# Patient Record
Sex: FEMALE | Race: AMERICAN INDIAN OR ALASKA NATIVE
[De-identification: names, ages, dates, MRNs, and addresses within clinical notes are randomized per-mention and may not be internally consistent; named-entity substitution may affect disease eponyms.]

---

## 2020-03-18 ENCOUNTER — HOSPITAL ENCOUNTER (EMERGENCY)
Dept: HOSPITAL 5 - ED | Age: 30
Discharge: LEFT BEFORE DISCHARGE | End: 2020-03-18
Payer: SELF-PAY

## 2020-03-18 VITALS — DIASTOLIC BLOOD PRESSURE: 72 MMHG | SYSTOLIC BLOOD PRESSURE: 117 MMHG

## 2020-03-18 DIAGNOSIS — R53.83: ICD-10-CM

## 2020-03-18 DIAGNOSIS — R51: Primary | ICD-10-CM

## 2020-03-18 LAB
ALBUMIN SERPL-MCNC: 4.6 G/DL (ref 3.9–5)
ALT SERPL-CCNC: 8 UNITS/L (ref 7–56)
BACTERIA #/AREA URNS HPF: (no result) /HPF
BAND NEUTROPHILS # (MANUAL): 0.2 K/MM3
BILIRUB UR QL STRIP: (no result)
BLOOD UR QL VISUAL: (no result)
BUN SERPL-MCNC: 11 MG/DL (ref 7–17)
BUN/CREAT SERPL: 18 %
CALCIUM SERPL-MCNC: 9.3 MG/DL (ref 8.4–10.2)
HCT VFR BLD CALC: 39.8 % (ref 30.3–42.9)
HEMOLYSIS INDEX: 6
HGB BLD-MCNC: 13.1 GM/DL (ref 10.1–14.3)
MCHC RBC AUTO-ENTMCNC: 33 % (ref 30–34)
MCV RBC AUTO: 90 FL (ref 79–97)
MUCOUS THREADS #/AREA URNS HPF: (no result) /HPF
MYELOCYTES # (MANUAL): 0 K/MM3
PH UR STRIP: 7 [PH] (ref 5–7)
PLATELET # BLD: 226 K/MM3 (ref 140–440)
PROMYELOCYTES # (MANUAL): 0 K/MM3
PROT UR STRIP-MCNC: (no result) MG/DL
RBC # BLD AUTO: 4.41 M/MM3 (ref 3.65–5.03)
RBC #/AREA URNS HPF: 11 /HPF (ref 0–6)
TOTAL CELLS COUNTED BLD: 100
UROBILINOGEN UR-MCNC: < 2 MG/DL (ref ?–2)
WBC #/AREA URNS HPF: < 1 /HPF (ref 0–6)

## 2020-03-18 PROCEDURE — 36415 COLL VENOUS BLD VENIPUNCTURE: CPT

## 2020-03-18 PROCEDURE — 99283 EMERGENCY DEPT VISIT LOW MDM: CPT

## 2020-03-18 PROCEDURE — 81025 URINE PREGNANCY TEST: CPT

## 2020-03-18 PROCEDURE — 85007 BL SMEAR W/DIFF WBC COUNT: CPT

## 2020-03-18 PROCEDURE — 85025 COMPLETE CBC W/AUTO DIFF WBC: CPT

## 2020-03-18 PROCEDURE — 80053 COMPREHEN METABOLIC PANEL: CPT

## 2020-03-18 PROCEDURE — 81001 URINALYSIS AUTO W/SCOPE: CPT

## 2020-03-18 NOTE — EMERGENCY DEPARTMENT REPORT
ED Headache HPI





- General


Chief Complaint: Headache


Stated Complaint: HEADACHE/FATIGUE


Time Seen by Provider: 03/18/20 14:37





- History of Present Illness


Initial Comments: 





This is a 29-year-old female nontoxic, well nourished in appearance, no acute 

signs of distress presents to the ED with c/o of acute on chronic headache.  Sta

hero has intermittent pelvic pains x1 month but denies any pain today.  Patient 

describes headache as diffuse with level of 3 out of 10.  Patient denies 

thunderclap headache.  Patient denies any radiation of pain.  Patient denies any

head trauma.  Patient denies any visual changes.  Patient denies worse headache.

 Patient stated that darkness makes headache better and bright lights make the 

headache worse.  Patient denies any numbness, tingling, fever, chills, nausea, 

vomiting, chest pain, shortness of breath, stiff neck.  Patient denies facial 

drooping or one sided weakness.  Patient denies any radiation of pain.  Patient 

denies any allergies.  Past medical history includes migraine headaches.


Timing/Duration: episodic


Quality: mild, achy


Recent Head Trauma: occasional headaches


Associated Symptoms: denies symptoms.  denies: confusion, fatigue, facial pain, 

fever/chills, flushing, loss of consciousness, nausea/vomiting, nasal 

congestion, nasal drainage, numbness in legs/feet, rash, seizures, sinus 

infection, stiff neck, vision changes, weakness


Allergies/Adverse Reactions: 


Allergies





No Known Allergies Allergy (Unverified 03/18/20 12:52)


   











ED Review of Systems


ROS: 


Stated complaint: HEADACHE/FATIGUE


Other details as noted in HPI





Constitutional: denies: chills, fever


Eyes: denies: eye pain, eye discharge, vision change


ENT: denies: ear pain, throat pain


Respiratory: denies: cough, shortness of breath, wheezing


Cardiovascular: denies: chest pain, palpitations


Endocrine: no symptoms reported


Gastrointestinal: denies: abdominal pain, nausea, diarrhea


Genitourinary: denies: urgency, dysuria, discharge


Musculoskeletal: denies: back pain, joint swelling, arthralgia


Skin: denies: rash, lesions


Neurological: headache.  denies: weakness, paresthesias


Psychiatric: denies: anxiety, depression


Hematological/Lymphatic: denies: easy bleeding, easy bruising





ED Past Medical Hx





- Past Medical History


Previous Medical History?: Yes


Hx Headaches / Migraines: Yes





- Surgical History


Past Surgical History?: No





- Social History


Smoking Status: Never Smoker


Substance Use Type: Alcohol





ED Physical Exam





- General


Limitations: No Limitations


General appearance: alert, in no apparent distress





- Head


Head exam: Present: atraumatic, normocephalic





- Eye


Eye exam: Present: normal appearance, PERRL, EOMI





- Neck


Neck exam: Present: normal inspection, full ROM.  Absent: tenderness, 

meningismus, lymphadenopathy





- Respiratory


Respiratory exam: Present: normal lung sounds bilaterally.  Absent: respiratory 

distress, wheezes, rales, rhonchi, stridor, chest wall tenderness, accessory 

muscle use, decreased breath sounds, prolonged expiratory





- Cardiovascular


Cardiovascular Exam: Present: regular rate, normal rhythm, normal heart sounds. 

Absent: irregular rhythm, systolic murmur, diastolic murmur, rubs, gallop





- GI/Abdominal


GI/Abdominal exam: Present: soft, normal bowel sounds.  Absent: distended, 

tenderness, guarding, rebound, rigid, diminished bowel sounds





- Extremities Exam


Extremities exam: Present: normal inspection, full ROM, normal capillary refill.

 Absent: tenderness





- Back Exam


Back exam: Present: normal inspection, full ROM.  Absent: tenderness, CVA 

tenderness (R), CVA tenderness (L), muscle spasm, paraspinal tenderness, 

vertebral tenderness, rash noted





- Neurological Exam


Neurological exam: Present: alert, oriented X3, normal gait





- Expanded Neurological Exam


  ** Expanded


Patient oriented to: Present: person, place, time


Cranial nerves: EOM's Intact: Normal, Facial Sensation: Normal


Cerebellar function: Finger to Nose: Normal


Upper motor neuron: Pronator Drift: Normal, Sensory Extinction: Normal


Motor strength exam: RUE: 5, LUE: 5, RLE: 5, LLE: 5


Best Eye Response ([place]): (4) open spontaneously


Best Motor Response ([place]): (6) obeys commands


Best Verbal Response ([place]): (5) oriented


[place] Total: 15





- Psychiatric


Psychiatric exam: Present: normal affect, normal mood





- Skin


Skin exam: Present: warm, dry, intact, normal color.  Absent: rash





ED Course





                                   Vital Signs











  03/18/20





  12:53


 


Temperature 97.9 F


 


Pulse Rate 96 H


 


Respiratory 16





Rate 


 


Blood Pressure 117/72


 


O2 Sat by Pulse 100





Oximetry 














- Reevaluation(s)


Reevaluation #1: 





03/18/20 15:43


Patient is speaking in full sentences with no signs of distress noted.





ED Medical Decision Making





- Lab Data


Result diagrams: 


                                 03/18/20 14:22





                                 03/18/20 14:22





- Medical Decision Making





This is a 29-year-old female that presents with headache.  Patient is stable and

was examined by me.  Patient is neurologically stable.  There is no stiff neck 

or neck pain.  Vital signs are stable.  Patient is afebrile.  Patient refused 

treatment in the ED or CT scan of head.  Stated she just wants blood work done 

for a "check-up". Patient signed AMA form. Patient was referred to Follow-up 

with a primary care/neurologist doctor in 3-5 days or if symptoms worsen and 

continue return to emergency room as soon as possible.  At time of discharge, 

the patient does not seem toxic or ill in appearance.  No acute signs of 

distress noted.  Patient agrees to discharge treatment plan of care.  No further

questions noted by the patient.


Critical care attestation.: 


If time is entered above; I have spent that time in minutes in the direct care 

of this critically ill patient, excluding procedure time.








ED Disposition


Clinical Impression: 


Headache


Qualifiers:


 Headache type: unspecified Headache chronicity pattern: episodic headache 

Intractability: not intractable Qualified Code(s): R51 - Headache





Disposition: Z-07 MED SCREENING EXAM-LEFT


Is pt being admited?: No


Does the pt Need Aspirin: No


Condition: Undetermined


Instructions:  Acute Headache (ED)


Additional Instructions: 


Follow-up with a primary care doctor in 3-5 days or if symptoms worsen and 

continue return to emergency room as soon as possible. 





Your condition may be serious as instructed and educated today in the ER but you

decided to leave AGAINST MEDICAL ADVICE.  It is highly recommended to see a 

provider as soon as possible to rule out serious complications that was 

described to you during your ED stay.


Referrals: 


DEEPA COOK MD [Primary Care Provider] - 3-5 Days


SUNIL RAMIREZ MD [Staff Physician] - 3-5 Days


[facility] [Provider Group] - 3-5 Days


Forms:  AMA Form

## 2020-03-18 NOTE — EVENT NOTE
ED Screening Note


Date of service: 03/18/20


Time: 13:58


ED Screening Note: 


30 yo female presents with headache intermittently x 1 month


states headache is getting worse, states localized to both sides of head, no 

relief with advil


denies n/v/ blurry vission


stating she does not feel well, cc of pelvic pain and other symptoms


recently 


 hx of headache but not this bad


LMP 3/17/20





This initial assessment/diagnostic orders/clinical plan/treatment(s) is/are 

subject to change based on patients health status, clinical progression and re-

assessment by fellow clinical providers in the ED. Further treatment and workup 

at subsequent clinical providers discretion. Patient/guardian urged not to elope

from the ED as their condition may be serious if not clinically assessed and 

managed. 





Initial orders include: 


treatment


IV gerardo and zofran, toradol ?


cbc,bmp,ua


acc eval

## 2021-04-22 ENCOUNTER — HOSPITAL ENCOUNTER (OUTPATIENT)
Dept: HOSPITAL 5 - TRG | Age: 31
Discharge: HOME | End: 2021-04-22
Attending: OBSTETRICS & GYNECOLOGY
Payer: SELF-PAY

## 2021-04-22 VITALS — DIASTOLIC BLOOD PRESSURE: 59 MMHG | SYSTOLIC BLOOD PRESSURE: 97 MMHG

## 2021-04-22 DIAGNOSIS — Z34.93: Primary | ICD-10-CM

## 2021-04-22 DIAGNOSIS — Z3A.32: ICD-10-CM

## 2021-04-22 LAB
BACTERIA #/AREA URNS HPF: (no result) /HPF
BILIRUB UR QL STRIP: (no result)
BLOOD UR QL VISUAL: (no result)
MUCOUS THREADS #/AREA URNS HPF: (no result) /HPF
PH UR STRIP: 8 [PH] (ref 5–7)
PROT UR STRIP-MCNC: (no result) MG/DL
RBC #/AREA URNS HPF: 2 /HPF (ref 0–6)
UROBILINOGEN UR-MCNC: < 2 MG/DL (ref ?–2)
WBC #/AREA URNS HPF: 2 /HPF (ref 0–6)

## 2021-04-22 PROCEDURE — 81001 URINALYSIS AUTO W/SCOPE: CPT

## 2021-06-09 ENCOUNTER — HOSPITAL ENCOUNTER (INPATIENT)
Dept: HOSPITAL 5 - TRG | Age: 31
LOS: 3 days | Discharge: HOME | End: 2021-06-12
Attending: OBSTETRICS & GYNECOLOGY | Admitting: OBSTETRICS & GYNECOLOGY
Payer: COMMERCIAL

## 2021-06-09 DIAGNOSIS — Z3A.39: ICD-10-CM

## 2021-06-09 DIAGNOSIS — A60.09: ICD-10-CM

## 2021-06-09 DIAGNOSIS — Z20.822: ICD-10-CM

## 2021-06-09 LAB
HCT VFR BLD CALC: 32.3 % (ref 30.3–42.9)
HGB BLD-MCNC: 11.1 GM/DL (ref 10.1–14.3)
MCHC RBC AUTO-ENTMCNC: 34 % (ref 30–34)
MCV RBC AUTO: 91 FL (ref 79–97)
PLATELET # BLD: 140 K/MM3 (ref 140–440)
RBC # BLD AUTO: 3.56 M/MM3 (ref 3.65–5.03)

## 2021-06-09 PROCEDURE — 85014 HEMATOCRIT: CPT

## 2021-06-09 PROCEDURE — 36415 COLL VENOUS BLD VENIPUNCTURE: CPT

## 2021-06-09 PROCEDURE — 96360 HYDRATION IV INFUSION INIT: CPT

## 2021-06-09 PROCEDURE — 96361 HYDRATE IV INFUSION ADD-ON: CPT

## 2021-06-09 PROCEDURE — 99211 OFF/OP EST MAY X REQ PHY/QHP: CPT

## 2021-06-09 PROCEDURE — 86850 RBC ANTIBODY SCREEN: CPT

## 2021-06-09 PROCEDURE — 86900 BLOOD TYPING SEROLOGIC ABO: CPT

## 2021-06-09 PROCEDURE — 85027 COMPLETE CBC AUTOMATED: CPT

## 2021-06-09 PROCEDURE — 84112 EVAL AMNIOTIC FLUID PROTEIN: CPT

## 2021-06-09 PROCEDURE — 86901 BLOOD TYPING SEROLOGIC RH(D): CPT

## 2021-06-09 PROCEDURE — 85018 HEMOGLOBIN: CPT

## 2021-06-09 PROCEDURE — G0463 HOSPITAL OUTPT CLINIC VISIT: HCPCS

## 2021-06-09 PROCEDURE — U0003 INFECTIOUS AGENT DETECTION BY NUCLEIC ACID (DNA OR RNA); SEVERE ACUTE RESPIRATORY SYNDROME CORONAVIRUS 2 (SARS-COV-2) (CORONAVIRUS DISEASE [COVID-19]), AMPLIFIED PROBE TECHNIQUE, MAKING USE OF HIGH THROUGHPUT TECHNOLOGIES AS DESCRIBED BY CMS-2020-01-R: HCPCS

## 2021-06-09 RX ADMIN — AMPICILLIN SODIUM SCH MLS/HR: 1 INJECTION, POWDER, FOR SOLUTION INTRAMUSCULAR; INTRAVENOUS at 22:36

## 2021-06-09 RX ADMIN — SODIUM CHLORIDE, SODIUM LACTATE, POTASSIUM CHLORIDE, AND CALCIUM CHLORIDE SCH MLS/HR: .6; .31; .03; .02 INJECTION, SOLUTION INTRAVENOUS at 18:03

## 2021-06-09 NOTE — HISTORY AND PHYSICAL REPORT
History of Present Illness


Date of examination: 21


Date of admission: 


2021





Chief complaint: 





Leaking fluid this 0100 this morning


History of present illness: 





31 yo, G1 @ 39 wks gestation, initiated care at 18 wks gestation with [facility].  She present to [facility] with reports of leaking fluid since 0100 this AM 

(21).  Reports positive FM.  Denies any VB.


Labs: A+, antibody negative; Rubella immune; HIV negative; HBsAg negative; 

GC/Chlamydia/Trich negative; HSV-2 positive; 1hr gtt - 81; GBS negative.





Past History


Past Medical History: no pertinent history


Past Surgical History: no surgical history


GYN History: other (HSV-2)


Family/Genetic History: none


Social history: , full code.  denies: smoking, alcohol abuse, 

prescription drug abuse, IV drug use





- Obstetrical History


Expected Date of Delivery: 21


Actual Gestation: 39 Week(s) 0 Day(s) 


: 1


Para: 0


Hx # Term Pregnancies: 0


Number of  Pregnancies: 0


Spontaneous Abortions: 0


Induced : 0


Number of Living Children: 0





Medications and Allergies


                                    Allergies











Allergy/AdvReac Type Severity Reaction Status Date / Time


 


No Known Allergies Allergy   Verified 21 07:44














Review of Systems


All systems: negative


Genitourinary: leakage of fluid (since 100 - 21)





- Vital Signs


Vital signs: 


                                   Vital Signs











Pulse Pulse Ox


 


 80   98 


 


 21 08:09  21 08:09








                                        











Temp Pulse Resp BP Pulse Ox


 


 98.2 F   72   20   95/59   99 


 


 21 08:16  21 09:34  21 08:16  21 09:10  21 09:34














- Physical Exam


Breasts: Positive: normal


Cardiovascular: Regular rate


Lungs: Positive: Normal air movement


Abdomen: Positive: other (gravid)


Genitourinary (Female): Positive: normal external genitalia, normal perenium


Vagina: Positive: other (scant amount of clear fluids noted)


Uterus: Positive: enlarged (S=D)


Deep Tendon Reflex Grade: Normal +2





- Obstetrical


FHR: category 1


Uterine Contraction Monitor Mode: External


Cervical Dilatation: 3


Cervical Effacement Percentage: 70


Fetal station: -2


Uterine Contraction Frequency (min): 5-7


Uterine Contraction Pattern: Irregular


Uterine Tone Measurement Phase: Resting


Uterine Contraction Intensity: Mild





Results


Result Diagrams: 


                                 21 09:45





                              Abnormal lab results











  21 Range/Units





  08:25 


 


Fetal Membranes Rupture  Positive A  (Negative)  








All other labs normal.








Assessment and Plan





- Patient Problems


(1) SROM (spontaneous rupture of membranes)


Current Visit: Yes   Status: Acute   


Plan to address problem: 


Admit to L&D


 Initiate Pitocin 


 Initiate Ampicillin for prolonged rupture


 Pain meds as desired


 Anticipate 








(2) HSV-2 seropositive


Current Visit: Yes   Status: Acute

## 2021-06-10 PROCEDURE — 0HQ9XZZ REPAIR PERINEUM SKIN, EXTERNAL APPROACH: ICD-10-PCS | Performed by: OBSTETRICS & GYNECOLOGY

## 2021-06-10 PROCEDURE — 3E0R3BZ INTRODUCTION OF ANESTHETIC AGENT INTO SPINAL CANAL, PERCUTANEOUS APPROACH: ICD-10-PCS

## 2021-06-10 PROCEDURE — 00HU33Z INSERTION OF INFUSION DEVICE INTO SPINAL CANAL, PERCUTANEOUS APPROACH: ICD-10-PCS

## 2021-06-10 RX ADMIN — IBUPROFEN SCH MG: 600 TABLET, FILM COATED ORAL at 18:35

## 2021-06-10 RX ADMIN — AMPICILLIN SODIUM SCH MLS/HR: 1 INJECTION, POWDER, FOR SOLUTION INTRAMUSCULAR; INTRAVENOUS at 03:10

## 2021-06-10 RX ADMIN — SODIUM CHLORIDE, SODIUM LACTATE, POTASSIUM CHLORIDE, AND CALCIUM CHLORIDE SCH MLS/HR: .6; .31; .03; .02 INJECTION, SOLUTION INTRAVENOUS at 00:55

## 2021-06-10 NOTE — PROGRESS NOTE
Labor Epidural





- Labor Epidural


Start Time: 02:07


Stop Time: 02:11


Performed by:: NASIR HIGGINS


Procedure: 


Patient is requesting epidural for labor pain.  H&P, and labs reviewed.  

Procedure explained, questions answered, consent obtained. Patient in sitting 

position with blood pressure cuff and pulse ox on and working. Timeout performed

immediately before start of procedure.  Sterile chlorahexadine 0.5% prep/drape. 

3 mL 1% lidocaine skin wheal at L[3]-L[4].  18-gauge semanticlabstead epidural needle 

advanced to loss-of-resistance with saline at [7] cm.  27-gauge spinal needle 

advanced until clear, free-flowing CSF. Intrathecal dexmedetomidine [5] mcg 

administered and needle removed. Epidural catheter advanced to [12] cm, negative

 aspiration for blood and csf, negative test dose 3 ml 1.5% lidocaine with 

epinephrine. Sterile steri-strips and tegaderm applied, followed by tape 

reinforcement. Patient tolerated procedure well.

## 2021-06-10 NOTE — ANESTHESIA CONSULTATION
Anesthesia Consult and Med Hx


Date of service: 06/10/21





- Airway


Anesthetic Teeth Evaluation: Good


ROM Head & Neck: Adequate


Mental/Hyoid Distance: Adequate


Mallampati Class: Class II


Intubation Access Assessment: Probably Good





- Pulmonary Exam


CTA: Yes





- Cardiac Exam


Cardiac Exam: RRR





- Pre-Operative Health Status


ASA Pre-Surgery Classification: ASA2


Proposed Anesthetic Plan: Epidural





- Pulmonary


Hx Asthma: No





- Cardiovascular System


Hx Hypertension: No





- Central Nervous System


Hx Seizures: No


Hx Psychiatric Problems: No





- Endocrine


Hx Renal Disease: No


Hx Hypothyroidism: No


Hx Hyperthyroidism: No





- Hematic


Hx Anemia: No


Hx Sickle Cell Disease: No





- Other Systems


Hx Alcohol Use: No (prior to pregnancy)

## 2021-06-10 NOTE — PROGRESS NOTE
Assessment and Plan





- Patient Problems


(1) SROM (spontaneous rupture of membranes)


Current Visit: Yes   Status: Acute   


Plan to address problem: 


Continue Pitocin 


 Continue Ampicillin for prolonged rupture


 Anticipate 








(2) HSV-2 seropositive


Current Visit: Yes   Status: Acute   





Subjective





- Subjective


Date of service: 06/10/21


Principal diagnosis: SROM; active labor


Interval history: 





29 yo, G1 @ 39 wks gestation, initiated care at 18 wks gestation with [facility].  She present to [facility] with reports of leaking fluid since 0100 this AM 

(21).  Reports positive FM.  Denies any VB.


Labs: A+, antibody negative; Rubella immune; HIV negative; HBsAg negative; G

C/Chlamydia/Trich negative; HSV-2 positive; 1hr gtt - 81; GBS negative.


Patient reports: loss of fluid, fetal movement normal, contractions, no vaginal 

bleeding





Objective





- Vital Signs


Vital Signs: 


                               Vital Signs - 12hr











  06/10/21 06/10/21 06/10/21





  01:30 01:37 01:45


 


Temperature   


 


Pulse Rate   191 H


 


Respiratory   





Rate   


 


Blood Pressure   


 


O2 Sat by Pulse 80 L 94 84





Oximetry   














  06/10/21 06/10/21 06/10/21





  01:56 02:02 02:03


 


Temperature   


 


Pulse Rate 75 85 74


 


Respiratory   





Rate   


 


Blood Pressure   


 


O2 Sat by Pulse 0 L 94 86





Oximetry   














  06/10/21 06/10/21 06/10/21





  02:08 02:13 02:15


 


Temperature   


 


Pulse Rate 105 H 99 H 85


 


Respiratory   





Rate   


 


Blood Pressure  109/65 113/67


 


O2 Sat by Pulse 99 99 





Oximetry   














  06/10/21 06/10/21 06/10/21





  02:17 02:18 02:19


 


Temperature   


 


Pulse Rate 87 97 H 92 H


 


Respiratory   





Rate   


 


Blood Pressure 105/61  100/59


 


O2 Sat by Pulse  96 94





Oximetry   














  06/10/21 06/10/21 06/10/21





  02:21 02:23 02:25


 


Temperature   


 


Pulse Rate 93 H 88 85


 


Respiratory   





Rate   


 


Blood Pressure 102/63 99/54 102/59


 


O2 Sat by Pulse  99 





Oximetry   














  06/10/21 06/10/21 06/10/21





  02:27 02:28 02:29


 


Temperature   


 


Pulse Rate 93 H 89 83


 


Respiratory   





Rate   


 


Blood Pressure 101/58  106/60


 


O2 Sat by Pulse  98 





Oximetry   














  06/10/21 06/10/21 06/10/21





  02:31 02:33 02:38


 


Temperature   


 


Pulse Rate 91 H 89 84


 


Respiratory   





Rate   


 


Blood Pressure 92/58  


 


O2 Sat by Pulse  97 98





Oximetry   














  06/10/21 06/10/21 06/10/21





  02:43 02:46 02:48


 


Temperature   


 


Pulse Rate 87 86 88


 


Respiratory   





Rate   


 


Blood Pressure  90/54 


 


O2 Sat by Pulse 97  97





Oximetry   














  06/10/21 06/10/21 06/10/21





  02:53 02:58 03:02


 


Temperature   


 


Pulse Rate 80 83 88


 


Respiratory   





Rate   


 


Blood Pressure   89/51


 


O2 Sat by Pulse 97 96 





Oximetry   














  06/10/21 06/10/21 06/10/21





  03:03 03:08 03:13


 


Temperature   


 


Pulse Rate 90 89 84


 


Respiratory   





Rate   


 


Blood Pressure   


 


O2 Sat by Pulse 96 96 96





Oximetry   














  06/10/21 06/10/21 06/10/21





  03:15 03:16 03:18


 


Temperature   


 


Pulse Rate 90 106 H 80


 


Respiratory   





Rate   


 


Blood Pressure 89/54 86/50 


 


O2 Sat by Pulse   97





Oximetry   














  06/10/21 06/10/21 06/10/21





  03:23 03:28 03:32


 


Temperature   


 


Pulse Rate 83 72 88


 


Respiratory   





Rate   


 


Blood Pressure   101/53


 


O2 Sat by Pulse 97 99 





Oximetry   














  06/10/21 06/10/21 06/10/21





  03:33 03:38 03:43


 


Temperature   


 


Pulse Rate 85 86 85


 


Respiratory   





Rate   


 


Blood Pressure   


 


O2 Sat by Pulse 97 97 97





Oximetry   














  06/10/21 06/10/21 06/10/21





  03:46 03:49 03:54


 


Temperature   


 


Pulse Rate 90 81 107 H


 


Respiratory   





Rate   


 


Blood Pressure 107/57  


 


O2 Sat by Pulse  98 100





Oximetry   














  06/10/21 06/10/21 06/10/21





  03:59 04:01 04:04


 


Temperature   


 


Pulse Rate 106 H 112 H 93 H


 


Respiratory   





Rate   


 


Blood Pressure  114/66 


 


O2 Sat by Pulse 100  99





Oximetry   














  06/10/21 06/10/21 06/10/21





  04:09 04:14 04:17


 


Temperature   


 


Pulse Rate 89 94 H 100 H


 


Respiratory   





Rate   


 


Blood Pressure   100/57


 


O2 Sat by Pulse 98 97 





Oximetry   














  06/10/21 06/10/21 06/10/21





  04:19 04:24 04:29


 


Temperature   


 


Pulse Rate 90 89 83


 


Respiratory   





Rate   


 


Blood Pressure   


 


O2 Sat by Pulse 96 96 97





Oximetry   














  06/10/21 06/10/21 06/10/21





  04:31 04:34 04:39


 


Temperature   


 


Pulse Rate 88 85 91 H


 


Respiratory   





Rate   


 


Blood Pressure 111/59  


 


O2 Sat by Pulse  96 98





Oximetry   














  06/10/21 06/10/21 06/10/21





  04:44 04:46 04:49


 


Temperature   


 


Pulse Rate 94 H 90 86


 


Respiratory   





Rate   


 


Blood Pressure  108/57 


 


O2 Sat by Pulse 96  97





Oximetry   














  06/10/21 06/10/21 06/10/21





  04:54 04:59 05:02


 


Temperature   


 


Pulse Rate 94 H 92 H 88


 


Respiratory   





Rate   


 


Blood Pressure   100/59


 


O2 Sat by Pulse 97 98 





Oximetry   














  06/10/21 06/10/21 06/10/21





  05:04 05:09 05:14


 


Temperature   


 


Pulse Rate 91 H 95 H 87


 


Respiratory   





Rate   


 


Blood Pressure   


 


O2 Sat by Pulse 97 98 97





Oximetry   














  06/10/21 06/10/21 06/10/21





  05:19 05:21 05:24


 


Temperature   99.3 F


 


Pulse Rate 117 H 113 H 91 H


 


Respiratory   





Rate   


 


Blood Pressure  98/53 


 


O2 Sat by Pulse 100  97





Oximetry   














  06/10/21 06/10/21 06/10/21





  05:29 05:33 05:34


 


Temperature   


 


Pulse Rate 81 81 89


 


Respiratory   





Rate   


 


Blood Pressure  113/62 


 


O2 Sat by Pulse 98  98





Oximetry   














  06/10/21 06/10/21 06/10/21





  05:39 05:44 05:47


 


Temperature   


 


Pulse Rate 86 99 H 89


 


Respiratory   





Rate   


 


Blood Pressure   111/64


 


O2 Sat by Pulse 98 98 





Oximetry   














  06/10/21 06/10/21 06/10/21





  05:49 05:54 05:59


 


Temperature   


 


Pulse Rate 97 H 87 88


 


Respiratory   





Rate   


 


Blood Pressure   


 


O2 Sat by Pulse 100 97 98





Oximetry   














  06/10/21 06/10/21 06/10/21





  06:02 06:04 06:09


 


Temperature   


 


Pulse Rate 100 H 112 H 96 H


 


Respiratory   





Rate   


 


Blood Pressure 110/64  


 


O2 Sat by Pulse  99 100





Oximetry   














  06/10/21 06/10/21 06/10/21





  06:14 06:18 06:19


 


Temperature   


 


Pulse Rate 91 H 85 85


 


Respiratory   





Rate   


 


Blood Pressure  98/54 


 


O2 Sat by Pulse 97  97





Oximetry   














  06/10/21 06/10/21 06/10/21





  06:24 06:29 06:31


 


Temperature   


 


Pulse Rate 90 86 95 H


 


Respiratory   





Rate   


 


Blood Pressure   93/50


 


O2 Sat by Pulse 96 96 





Oximetry   














  06/10/21 06/10/21 06/10/21





  06:34 06:39 06:44


 


Temperature   


 


Pulse Rate 86 86 97 H


 


Respiratory   





Rate   


 


Blood Pressure   


 


O2 Sat by Pulse 96 96 100





Oximetry   














  06/10/21 06/10/21 06/10/21





  06:47 06:49 06:54


 


Temperature   


 


Pulse Rate 84 85 92 H


 


Respiratory   





Rate   


 


Blood Pressure 89/50  


 


O2 Sat by Pulse  98 99





Oximetry   














  06/10/21 06/10/21 06/10/21





  06:59 07:03 07:04


 


Temperature   


 


Pulse Rate 91 H 81 86


 


Respiratory   





Rate   


 


Blood Pressure  96/53 


 


O2 Sat by Pulse 99  97





Oximetry   














  06/10/21 06/10/21 06/10/21





  07:09 07:14 07:18


 


Temperature   


 


Pulse Rate 93 H 84 83


 


Respiratory   





Rate   


 


Blood Pressure   82/45


 


O2 Sat by Pulse 97 95 91





Oximetry   














  06/10/21 06/10/21 06/10/21





  07:19 07:24 07:29


 


Temperature   


 


Pulse Rate 95 H 89 86


 


Respiratory   





Rate   


 


Blood Pressure   


 


O2 Sat by Pulse 96 98 100





Oximetry   














  06/10/21 06/10/21 06/10/21





  07:32 07:34 07:39


 


Temperature   


 


Pulse Rate 104 H 98 H 90


 


Respiratory   





Rate   


 


Blood Pressure 112/69  


 


O2 Sat by Pulse  99 100





Oximetry   














  06/10/21 06/10/21 06/10/21





  07:41 07:44 07:47


 


Temperature 98.6 F  


 


Pulse Rate  94 H 93 H


 


Respiratory 18  





Rate   


 


Blood Pressure   109/55


 


O2 Sat by Pulse  99 





Oximetry   














  06/10/21 06/10/21 06/10/21





  07:49 07:54 07:59


 


Temperature   


 


Pulse Rate 97 H 80 90


 


Respiratory   





Rate   


 


Blood Pressure   


 


O2 Sat by Pulse 100 99 98





Oximetry   














  06/10/21 06/10/21 06/10/21





  08:01 08:04 08:09


 


Temperature   


 


Pulse Rate 88 80 84


 


Respiratory   





Rate   


 


Blood Pressure 111/58  


 


O2 Sat by Pulse  97 97





Oximetry   














  06/10/21 06/10/21 06/10/21





  08:14 08:16 08:19


 


Temperature   


 


Pulse Rate 84 93 H 81


 


Respiratory   





Rate   


 


Blood Pressure  103/60 


 


O2 Sat by Pulse 98  97





Oximetry   














  06/10/21 06/10/21 06/10/21





  08:24 08:29 08:31


 


Temperature   


 


Pulse Rate 84 83 93 H


 


Respiratory   





Rate   


 


Blood Pressure   106/61


 


O2 Sat by Pulse 96 96 





Oximetry   














  06/10/21 06/10/21 06/10/21





  08:34 08:39 08:44


 


Temperature   


 


Pulse Rate 82 91 H 80


 


Respiratory   





Rate   


 


Blood Pressure   


 


O2 Sat by Pulse 97 99 97





Oximetry   














  06/10/21 06/10/21 06/10/21





  08:47 08:49 08:54


 


Temperature   


 


Pulse Rate 81 83 82


 


Respiratory   





Rate   


 


Blood Pressure 111/65  


 


O2 Sat by Pulse  98 96





Oximetry   














  06/10/21 06/10/21 06/10/21





  08:59 09:01 09:04


 


Temperature   


 


Pulse Rate 81 82 100 H


 


Respiratory   





Rate   


 


Blood Pressure  115/68 


 


O2 Sat by Pulse 96  98





Oximetry   














  06/10/21 06/10/21 06/10/21





  09:09 09:14 09:17


 


Temperature   


 


Pulse Rate 82 93 H 96 H


 


Respiratory   





Rate   


 


Blood Pressure   123/68


 


O2 Sat by Pulse 97 97 





Oximetry   














  06/10/21 06/10/21 06/10/21





  09:19 09:24 09:29


 


Temperature   


 


Pulse Rate 84 81 83


 


Respiratory   





Rate   


 


Blood Pressure   


 


O2 Sat by Pulse 100 98 99





Oximetry   














  06/10/21 06/10/21 06/10/21





  09:32 09:34 09:36


 


Temperature   99.5 F


 


Pulse Rate 90 76 


 


Respiratory   





Rate   


 


Blood Pressure 116/70  


 


O2 Sat by Pulse  98 





Oximetry   














  06/10/21 06/10/21 06/10/21





  09:39 09:44 09:48


 


Temperature   


 


Pulse Rate 81 82 82


 


Respiratory   





Rate   


 


Blood Pressure   116/67


 


O2 Sat by Pulse 99 98 





Oximetry   














  06/10/21 06/10/21 06/10/21





  09:49 09:54 09:59


 


Temperature   


 


Pulse Rate 82 76 97 H


 


Respiratory   





Rate   


 


Blood Pressure   


 


O2 Sat by Pulse 98 99 100





Oximetry   














  06/10/21 06/10/21 06/10/21





  10:01 10:04 10:08


 


Temperature   


 


Pulse Rate 82 87 93 H


 


Respiratory   





Rate   


 


Blood Pressure 112/64  


 


O2 Sat by Pulse  100 92





Oximetry   














  06/10/21 06/10/21 06/10/21





  10:09 10:14 10:18


 


Temperature   


 


Pulse Rate 93 H 82 76


 


Respiratory   





Rate   


 


Blood Pressure   118/69


 


O2 Sat by Pulse 94 99 





Oximetry   














  06/10/21 06/10/21 06/10/21





  10:19 10:24 10:29


 


Temperature   


 


Pulse Rate 83 75 86


 


Respiratory   





Rate   


 


Blood Pressure   


 


O2 Sat by Pulse 96 100 98





Oximetry   














  06/10/21 06/10/21 06/10/21





  10:32 10:34 10:39


 


Temperature   


 


Pulse Rate 76 83 90


 


Respiratory   





Rate   


 


Blood Pressure 116/64  


 


O2 Sat by Pulse  98 98





Oximetry   














  06/10/21 06/10/21 06/10/21





  10:44 10:47 10:49


 


Temperature   


 


Pulse Rate 87 100 H 99 H


 


Respiratory   





Rate   


 


Blood Pressure  114/71 


 


O2 Sat by Pulse 100  99





Oximetry   














  06/10/21 06/10/21 06/10/21





  10:54 11:00 11:03


 


Temperature   


 


Pulse Rate 90 93 H 81


 


Respiratory   





Rate   


 


Blood Pressure   130/60


 


O2 Sat by Pulse 100 99 





Oximetry   














  06/10/21 06/10/21 06/10/21





  11:05 11:09 11:10


 


Temperature   


 


Pulse Rate 136 H 57 L 89


 


Respiratory   





Rate   


 


Blood Pressure   


 


O2 Sat by Pulse 95 62 L 99





Oximetry   














  06/10/21 06/10/21 06/10/21





  11:15 11:16 11:20


 


Temperature   


 


Pulse Rate 100 H 101 H 90


 


Respiratory   





Rate   


 


Blood Pressure  118/56 


 


O2 Sat by Pulse 99 82 L 98





Oximetry   














  06/10/21 06/10/21 06/10/21





  11:24 11:25 11:30


 


Temperature   


 


Pulse Rate 87 94 H 92 H


 


Respiratory   





Rate   


 


Blood Pressure   


 


O2 Sat by Pulse 93 96 99





Oximetry   














  06/10/21 06/10/21 06/10/21





  11:31 11:35 11:40


 


Temperature   


 


Pulse Rate 93 H 89 85


 


Respiratory   





Rate   


 


Blood Pressure 120/58  


 


O2 Sat by Pulse  100 100





Oximetry   














  06/10/21 06/10/21 06/10/21





  11:45 11:47 11:50


 


Temperature   


 


Pulse Rate 92 H 82 87


 


Respiratory   





Rate   


 


Blood Pressure  119/66 


 


O2 Sat by Pulse 99  100





Oximetry   














  06/10/21 06/10/21 06/10/21





  11:55 12:00 12:02


 


Temperature   


 


Pulse Rate 83 97 H 81


 


Respiratory   





Rate   


 


Blood Pressure   120/69


 


O2 Sat by Pulse 100 98 





Oximetry   














  06/10/21 06/10/21 06/10/21





  12:05 12:10 12:13


 


Temperature   


 


Pulse Rate 89 89 


 


Respiratory   





Rate   


 


Blood Pressure   


 


O2 Sat by Pulse 99 100 47 L





Oximetry   














  06/10/21 06/10/21 06/10/21





  12:16 12:17 12:21


 


Temperature   


 


Pulse Rate 86 97 H 76


 


Respiratory   





Rate   


 


Blood Pressure  131/77 


 


O2 Sat by Pulse 98  97





Oximetry   














  06/10/21 06/10/21 06/10/21





  12:26 12:31 12:36


 


Temperature   


 


Pulse Rate 97 H 105 H 85


 


Respiratory   





Rate   


 


Blood Pressure  118/77 


 


O2 Sat by Pulse 97 92 98





Oximetry   














  06/10/21 06/10/21 06/10/21





  12:37 12:41 12:46


 


Temperature   


 


Pulse Rate 109 H 88 85


 


Respiratory   





Rate   


 


Blood Pressure   


 


O2 Sat by Pulse 93 99 99





Oximetry   














  06/10/21 06/10/21 06/10/21





  12:47 12:51 12:56


 


Temperature   


 


Pulse Rate 113 H 100 H 105 H


 


Respiratory   





Rate   


 


Blood Pressure 121/58  


 


O2 Sat by Pulse 84 98 97





Oximetry   














  06/10/21 06/10/21 06/10/21





  13:01 13:02 13:06


 


Temperature   


 


Pulse Rate 104 H 86 105 H


 


Respiratory   





Rate   


 


Blood Pressure  111/59 


 


O2 Sat by Pulse 99  96





Oximetry   














  06/10/21 06/10/21 06/10/21





  13:11 13:16 13:17


 


Temperature   


 


Pulse Rate 89 106 H 81


 


Respiratory   





Rate   


 


Blood Pressure   123/71


 


O2 Sat by Pulse 98 99 





Oximetry   














  06/10/21 06/10/21





  13:21 13:26


 


Temperature  


 


Pulse Rate 94 H 93 H


 


Respiratory  





Rate  


 


Blood Pressure  


 


O2 Sat by Pulse 100 99





Oximetry  














- Exam


Breasts: deferred


Cardiovascular: Regular rate


Lungs: Normal air movement


FHR: category 1


Uterine Contraction Monitor Mode: External


Cervical Dilatation: 10 (vertex)


Cervical Effacement Percentage: 100 (Pitocin @ 12mu/min)


Fetal station: 0


Uterine Contraction Frequency (min): 2-3


Uterine Contraction Pattern: Regular


Uterine Tone Measurement Phase: Resting


Uterine Contraction Intensity: Strong/Firm





- Labs


Labs: 


                                  Abnormal Labs











  21





  08:25 09:45


 


RBC   3.56 L


 


Fetal Membranes Rupture  Positive A

## 2021-06-10 NOTE — PROCEDURE NOTE
OB Delivery Note





- Delivery


Date of Delivery: 06/10/21 (1415)


Surgeon: KAYLYN JOHNSON (CNM)


Estimated blood loss: other (QBL - 170mls)





- Vaginal


Delivery presentation: vertex


Delivery position: OA (JAMEL)


Intrapartum events: meconium (term), prolonged active phase, other(please 

specify) (SROM > 24hrs)


Delivery induction: none


Delivery augmentation: rupture of membranes (SROM @ 0100 on 21), pitocin


Delivery monitor: external FHT, external uterine


Route of delivery: 


Delivery placenta: spontaneous ()


Delivery cord: 3 umbilical vessels, other (Cord around left ankle x 2, reduced 

immediately at delivery)


Episiotomy: none


Delivery laceration: 1st degree (right vaginal wall)


Delivery repair: vicryl (3.0 - SH)


Anesthesia: epidural


Delivery comments: 





 of viable, crying male infant, placed directly on maternal abdomen.  Cord 

double clamped, cut by FOB after cessation of pulsation.  Placenta spontaneously

delivered, disposed per hospital policy.  Uterus firm @ U-3, hemostasis 

maintained.  1st degree laceration of right vaginal floor wall, repaired.  

Mother and baby safe, stable and left in care of RN.





- Infant


  ** A


Apgar at 1 minute: 8


Apgar at 5 minutes: 9


Infant Gender: Male (Weight: 3373 gms (7lbs 7ozs) 19 inches)

## 2021-06-11 LAB
HCT VFR BLD CALC: 28.7 % (ref 30.3–42.9)
HGB BLD-MCNC: 9.8 GM/DL (ref 10.1–14.3)

## 2021-06-11 RX ADMIN — IBUPROFEN SCH MG: 600 TABLET, FILM COATED ORAL at 06:06

## 2021-06-11 RX ADMIN — Medication SCH EACH: at 09:52

## 2021-06-11 RX ADMIN — IBUPROFEN SCH MG: 600 TABLET, FILM COATED ORAL at 23:17

## 2021-06-11 RX ADMIN — IBUPROFEN SCH MG: 600 TABLET, FILM COATED ORAL at 00:32

## 2021-06-11 RX ADMIN — IBUPROFEN SCH MG: 600 TABLET, FILM COATED ORAL at 18:14

## 2021-06-11 RX ADMIN — IBUPROFEN SCH MG: 600 TABLET, FILM COATED ORAL at 12:12

## 2021-06-11 NOTE — PROGRESS NOTE
Assessment and Plan





- Patient Problems


(1) SROM (spontaneous rupture of membranes)


Current Visit: Yes   Status: Acute   


Plan to address problem: 


patient doing well


discharge home








Subjective





- Subjective


Date of service: 21


Principal diagnosis: SROM; active labor


Interval history: 





Patient without complaints


Patient reports: appetite normal, voiding normally, pain well controlled


[place]: doing well





Objective





- Vital Signs


Latest vital signs: 


                                   Vital Signs











  Temp Pulse Resp BP BP Pulse Ox


 


 21 07:03    18   


 


 21 06:06    18   


 


 21 04:00  98.7 F  72  16   101/77 


 


 21 01:32    18   


 


 21 00:32    18   


 


 21 00:22  98.0 F  82  18  102/56   98


 


 06/10/21 20:07  98.6 F  99 H  18  103/61   98


 


 06/10/21 19:35    18   


 


 06/10/21 17:00  98.8 F  74  17   115/68  100


 


 06/10/21 16:01   72   126/67  


 


 06/10/21 15:46   74   133/78  


 


 06/10/21 15:31   78   122/73  


 


 06/10/21 15:16     122/73  


 


 06/10/21 15:01   82   123/69  


 


 06/10/21 14:47   84   112/66  


 


 06/10/21 14:36   87     99


 


 06/10/21 14:32   84   119/63  


 


 06/10/21 14:31   80     99


 


 06/10/21 14:26   79     98


 


 06/10/21 14:21   77     100


 


 06/10/21 14:17   43 L   129/79  


 


 06/10/21 14:16   102 H     98


 


 06/10/21 14:13   122 H     88


 


 06/10/21 14:11   98 H     98


 


 06/10/21 14:06   87     98


 


 06/10/21 14:02   52 L   144/67   78 L


 


 06/10/21 14:01   92 H     99


 


 06/10/21 13:56   83     94


 


 06/10/21 13:51   103 H     99


 


 06/10/21 13:48   81   128/66  


 


 06/10/21 13:46   72     100


 


 06/10/21 13:41   87     100


 


 06/10/21 13:36   85     100


 


 06/10/21 13:33   89   106/52  


 


 06/10/21 13:31   89     100


 


 06/10/21 13:26   93 H     99


 


 06/10/21 13:21   94 H     100


 


 06/10/21 13:17   81   123/71  


 


 06/10/21 13:16   106 H     99


 


 06/10/21 13:11   89     98


 


 06/10/21 13:06   105 H     96


 


 06/10/21 13:02   86   111/59  


 


 06/10/21 13:01   104 H     99


 


 06/10/21 12:56   105 H     97


 


 06/10/21 12:51   100 H     98


 


 06/10/21 12:47   113 H   121/58   84


 


 06/10/21 12:46   85     99


 


 06/10/21 12:41   88     99


 


 06/10/21 12:37   109 H     93


 


 06/10/21 12:36   85     98


 


 06/10/21 12:31   105 H   118/77   92


 


 06/10/21 12:26   97 H     97


 


 06/10/21 12:21   76     97


 


 06/10/21 12:17   97 H   131/77  


 


 06/10/21 12:16   86     98


 


 06/10/21 12:13       47 L


 


 06/10/21 12:10   89     100


 


 06/10/21 12:05   89     99


 


 06/10/21 12:02   81   120/69  


 


 06/10/21 12:00   97 H     98


 


 06/10/21 11:55   83     100


 


 06/10/21 11:50   87     100


 


 06/10/21 11:47   82   119/66  


 


 06/10/21 11:45   92 H     99


 


 06/10/21 11:40   85     100


 


 06/10/21 11:35   89     100


 


 06/10/21 11:31   93 H   120/58  


 


 06/10/21 11:30   92 H     99


 


 06/10/21 11:25   94 H     96


 


 06/10/21 11:24   87     93


 


 06/10/21 11:20   90     98


 


 06/10/21 11:16   101 H   118/56   82 L


 


 06/10/21 11:15   100 H     99


 


 06/10/21 11:10   89     99


 


 06/10/21 11:09   57 L     62 L


 


 06/10/21 11:05   136 H     95


 


 06/10/21 11:03   81   130/60  


 


 06/10/21 11:00   93 H     99


 


 06/10/21 10:54   90     100


 


 06/10/21 10:49   99 H     99


 


 06/10/21 10:47   100 H   114/71  


 


 06/10/21 10:44   87     100


 


 06/10/21 10:39   90     98


 


 06/10/21 10:34   83     98


 


 06/10/21 10:32   76   116/64  


 


 06/10/21 10:29   86     98


 


 06/10/21 10:24   75     100


 


 06/10/21 10:19   83     96


 


 06/10/21 10:18   76   118/69  


 


 06/10/21 10:14   82     99


 


 06/10/21 10:09   93 H     94


 


 06/10/21 10:08   93 H     92


 


 06/10/21 10:04   87     100


 


 06/10/21 10:01   82   112/64  


 


 06/10/21 09:59   97 H     100


 


 06/10/21 09:54   76     99


 


 06/10/21 09:49   82     98


 


 06/10/21 09:48   82   116/67  


 


 06/10/21 09:44   82     98


 


 06/10/21 09:39   81     99


 


 06/10/21 09:36  99.5 F     


 


 06/10/21 09:34   76     98


 


 06/10/21 09:32   90   116/70  


 


 06/10/21 09:29   83     99


 


 06/10/21 09:24   81     98


 


 06/10/21 09:19   84     100


 


 06/10/21 09:17   96 H   123/68  


 


 06/10/21 09:14   93 H     97


 


 06/10/21 09:09   82     97


 


 06/10/21 09:04   100 H     98


 


 06/10/21 09:01   82   115/68  


 


 06/10/21 08:59   81     96


 


 06/10/21 08:54   82     96


 


 06/10/21 08:49   83     98


 


 06/10/21 08:47   81   111/65  


 


 06/10/21 08:44   80     97


 


 06/10/21 08:39   91 H     99


 


 06/10/21 08:34   82     97


 


 06/10/21 08:31   93 H   106/61  


 


 06/10/21 08:29   83     96


 


 06/10/21 08:24   84     96


 


 06/10/21 08:19   81     97


 


 06/10/21 08:16   93 H   103/60  


 


 06/10/21 08:14   84     98


 


 06/10/21 08:09   84     97








                                Intake and Output











 06/10/21 06/11/21 06/11/21





 22:59 06:59 14:59


 


Intake Total 240 960 


 


Output Total 1800 600 


 


Balance -1560 360 


 


Intake:   


 


  Intake, Free Water 240 960 


 


Output:   


 


  Urine 1800 600 


 


    Self-Catheterization 800  


 


    Void 1000 600 


 


Other:   


 


  Total, Output Amount 1000 600 


 


  # Voids   


 


    Void 1 1 














- Labs


Labs: 


                              Abnormal lab results











  21 Range/Units





  02:50 


 


Hgb  9.8 L  (10.1-14.3)  gm/dl


 


Hct  28.7 L  (30.3-42.9)  %

## 2021-06-11 NOTE — DISCHARGE SUMMARY
Providers





- Providers


Date of Admission: 


21 09:31





Date of discharge: 21


Attending physician: 


DUKE HINES





Primary care physician: 


DUKE HINES








Hospitalization


Reason for admission: active labor, rupture of membranes


Delivery: 


Discharge diagnosis: IUP at term delivered


Hospital course: 





Patient admitted with SROM. Had a . Postpartum uncomplicated


Condition at discharge: Good


Disposition: DC-01 TO HOME OR SELFCARE





- Discharge Diagnoses


(1) SROM (spontaneous rupture of membranes)


Status: Acute   





Plan





- Discharge Medications


Prescriptions: 


Ibuprofen [Motrin] 800 mg PO Q8HR PRN #30 tablet


 PRN Reason: Pain , Severe (7-10)


HYDROcodone/APAP 5-325 [[place] 5/325] 1 each PO Q6HR PRN #15 tablet


 PRN Reason: Pain





- Provider Discharge Summary


Activity: no sex for 6 weeks, no heavy lifting 4 weeks, no strenuous exercise


Diet: routine


Instructions: routine


Additional instructions: 


[]  Smoking cessation referral if applicable(refer to patient education folder 

for contact #)


[]  Refer to [facility] Booklet








Call your doctor immediately for:


* Fever > 100.5


* Heavy vaginal bleeding ( >1 pad per hour)


* Severe persistent headache


* Shortness of breath


* Reddened, hot, painful area to leg or breast


* schedule postpartum visit in 4 weeks








- Follow up plan

## 2021-06-12 VITALS — SYSTOLIC BLOOD PRESSURE: 108 MMHG | DIASTOLIC BLOOD PRESSURE: 68 MMHG

## 2021-06-12 RX ADMIN — IBUPROFEN SCH MG: 600 TABLET, FILM COATED ORAL at 13:19

## 2021-06-12 RX ADMIN — Medication SCH EACH: at 09:26

## 2021-06-12 RX ADMIN — IBUPROFEN SCH MG: 600 TABLET, FILM COATED ORAL at 06:01

## 2021-11-11 ENCOUNTER — HOSPITAL ENCOUNTER (EMERGENCY)
Dept: HOSPITAL 5 - ED | Age: 31
Discharge: LEFT BEFORE DISCHARGE | End: 2021-11-11
Payer: COMMERCIAL

## 2021-11-11 DIAGNOSIS — Z79.899: ICD-10-CM

## 2021-11-11 DIAGNOSIS — G43.909: ICD-10-CM

## 2021-11-11 DIAGNOSIS — R05.9: ICD-10-CM

## 2021-11-11 DIAGNOSIS — B34.9: Primary | ICD-10-CM

## 2021-11-11 PROCEDURE — 87400 INFLUENZA A/B EACH AG IA: CPT

## 2021-11-11 PROCEDURE — 99283 EMERGENCY DEPT VISIT LOW MDM: CPT

## 2021-11-11 PROCEDURE — 71046 X-RAY EXAM CHEST 2 VIEWS: CPT

## 2021-11-11 NOTE — XRAY REPORT
CHEST 2 VIEWS 



INDICATION / CLINICAL INFORMATION:

cough.



COMPARISON: 

None available.



FINDINGS:



SUPPORT DEVICES: None.



HEART / MEDIASTINUM: No significant abnormality. 



LUNGS / PLEURA: No significant pulmonary or pleural abnormality. No pneumothorax. 



ADDITIONAL FINDINGS: No significant additional findings.



IMPRESSION:

1. No acute findings.



Signer Name: Shamir Cintron MD 

Signed: 11/11/2021 3:45 PM

Workstation Name: [id number]

## 2021-11-11 NOTE — EMERGENCY DEPARTMENT REPORT
ED General Adult HPI





- General


Chief complaint: Upper Respiratory Infection


Stated complaint: HEADACHE/COUGING/FATIGUE


Time Seen by Provider: 11/11/21 15:18


Source: patient


Mode of arrival: Ambulatory


Limitations: No Limitations





- History of Present Illness


Initial comments: 





31-year-old -American female patient presents with complaints of cough, 

body aches/chills, and headache x Yesterday.  She denies any loss of taste or 

smell, fever, chest pain, shortness of breath, hemoptysis, or 

nausea/vomiting/diarrhea.  No recent known sick contacts per patient.  She 

denies being vaccinated for COVID-19 and has not had testing performed.  No past

medical history per patient.  She also denies flu vaccination





- Related Data


                                  Previous Rx's











 Medication  Instructions  Recorded  Last Taken  Type


 


HYDROcodone/APAP 5-325 [[place] 1 each PO Q6HR PRN #15 tablet 06/11/21 Unknown Rx





5/325]    


 


Ibuprofen [Motrin] 800 mg PO Q8HR PRN #30 tablet 06/11/21 Unknown Rx


 


Benzonatate 200 mg PO TID PRN #20 capsule 11/11/21 Unknown Rx


 


Ibuprofen [Motrin 800 MG tab] 800 mg PO Q8HR PRN #15 tablet 11/11/21 Unknown Rx











                                    Allergies











Allergy/AdvReac Type Severity Reaction Status Date / Time


 


No Known Allergies Allergy   Verified 11/11/21 14:33














ED Review of Systems


ROS: 


Stated complaint: HEADACHE/COUGING/FATIGUE


Other details as noted in HPI





Constitutional: chills, malaise.  denies: fever


Respiratory: cough.  denies: shortness of breath


Cardiovascular: denies: chest pain


Gastrointestinal: denies: abdominal pain, nausea, vomiting


Neurological: headache (Denies trying any OTC medication)





ED Past Medical Hx





- Past Medical History


Hx Hypertension: No


Hx Diabetes: No


Hx Deep Vein Thrombosis: No


Hx Renal Disease: No


Hx Sickle Cell Disease: No


Hx Headaches / Migraines: Yes


Hx Seizures: No


Hx Asthma: No


Hx HIV: No





- Social History


Smoking Status: Never Smoker





- Medications


Home Medications: 


                                Home Medications











 Medication  Instructions  Recorded  Confirmed  Last Taken  Type


 


HYDROcodone/APAP 5-325 [[place] 1 each PO Q6HR PRN #15 tablet 06/11/21  Unknown Rx





5/325]     


 


Ibuprofen [Motrin] 800 mg PO Q8HR PRN #30 tablet 06/11/21  Unknown Rx


 


Benzonatate 200 mg PO TID PRN #20 capsule 11/11/21  Unknown Rx


 


Ibuprofen [Motrin 800 MG tab] 800 mg PO Q8HR PRN #15 tablet 11/11/21  Unknown Rx














ED Physical Exam





- General


Limitations: No Limitations


General appearance: alert, in no apparent distress





- Head


Head exam: Present: atraumatic, normocephalic





- Eye


Eye exam: Present: normal appearance.  Absent: scleral icterus





- Neck


Neck exam: Present: normal inspection





- Respiratory


Respiratory exam: Present: normal lung sounds bilaterally.  Absent: respiratory 

distress





- Cardiovascular


Cardiovascular Exam: Present: regular rate, normal rhythm.  Absent: systolic 

murmur, diastolic murmur, rubs, gallop





- Neurological Exam


Neurological exam: Present: alert, oriented X3





- Psychiatric


Psychiatric exam: Present: normal affect, normal mood





- Skin


Skin exam: Present: warm, dry, intact, normal color.  Absent: rash





ED Course





                                   Vital Signs











  11/11/21 11/11/21





  14:32 15:56


 


Temperature 98.0 F 


 


Pulse Rate 74 


 


Respiratory 20 





Rate  


 


Blood Pressure 125/82 


 


O2 Sat by Pulse 100 98





Oximetry  














ED Medical Decision Making





- Radiology Data


Radiology results: report reviewed





CHEST 2 VIEWS   


 


 INDICATION / CLINICAL INFORMATION:  


 cough.  


 


 COMPARISON:   


 None available.  


 


 FINDINGS:  


 


 SUPPORT DEVICES: None.  


 


 HEART / MEDIASTINUM: No significant abnormality.   


 


 LUNGS / PLEURA: No significant pulmonary or pleural abnormality. No 

pneumothorax.   


 


 ADDITIONAL FINDINGS: No significant additional findings.  


 


 IMPRESSION:  


 1. No acute findings.  





- Medical Decision Making








31-year-old -American female patient presents with complaints of cough, 

body aches/chills, and headache x Yesterday.  She denies any loss of taste or 

smell, fever, chest pain, shortness of breath, hemoptysis, or 

nausea/vomiting/diarrhea.  No recent known sick contacts per patient.  She 

denies being vaccinated for COVID-19 and has not had testing performed.  No past

medical history per patient.  She also denies flu vaccination





Lungs are clear to auscultation bilaterally on exam. Patient's flu test is 

negative. Her vitals are normal and she is well-appearing. Recommend patient 

follows up outpatient for COVID-19 testing within the next 24 to 48 hours and 

self quarantine until further instructed. She is also to follow-up with primary 

care within 3 to 5 days. Discussed in detail signs and symptoms that should 

prompt immediate return to the ED with patient who verbalizes understanding.


Critical care attestation.: 


If time is entered above; I have spent that time in minutes in the direct care 

of this critically ill patient, excluding procedure time.








ED Disposition


Clinical Impression: 


 Cough, Viral syndrome





Disposition: 01 HOME / SELF CARE / HOMELESS


Is pt being admited?: No


Condition: Stable


Instructions:  Viral Respiratory Infection


Additional Instructions: 


Your chest x-ray is normal. Your flu test is also negative. Please follow-up 

outpatient for COVID-19 testing within the next 24 to 48 hours and self 

quarantine until you receive further instructions. If you develop new or 

worsening symptoms, seek immediate emergency treatment.


Prescriptions: 


Benzonatate 200 mg PO TID PRN #20 capsule


 PRN Reason: Cough


Ibuprofen [Motrin 800 MG tab] 800 mg PO Q8HR PRN #15 tablet


 PRN Reason: Headache


Referrals: 


[facility] [Provider Group] - 3-5 Days


Forms:  Work/School Release Form(ED)